# Patient Record
Sex: FEMALE | Race: WHITE | URBAN - METROPOLITAN AREA
[De-identification: names, ages, dates, MRNs, and addresses within clinical notes are randomized per-mention and may not be internally consistent; named-entity substitution may affect disease eponyms.]

---

## 2019-01-07 ENCOUNTER — DOCTOR'S OFFICE (OUTPATIENT)
Dept: URBAN - METROPOLITAN AREA CLINIC 136 | Facility: CLINIC | Age: 64
Setting detail: OPHTHALMOLOGY
End: 2019-01-07
Payer: COMMERCIAL

## 2019-01-07 DIAGNOSIS — H01.001: ICD-10-CM

## 2019-01-07 DIAGNOSIS — H25.13: ICD-10-CM

## 2019-01-07 DIAGNOSIS — H01.004: ICD-10-CM

## 2019-01-07 DIAGNOSIS — D21.0: ICD-10-CM

## 2019-01-07 DIAGNOSIS — H43.813: ICD-10-CM

## 2019-01-07 PROCEDURE — 92014 COMPRE OPH EXAM EST PT 1/>: CPT | Performed by: OPHTHALMOLOGY

## 2019-01-07 PROCEDURE — 92225 OPHTHALMOSCOPY EXTENDED INITIAL: CPT | Performed by: OPHTHALMOLOGY

## 2019-01-07 ASSESSMENT — REFRACTION_MANIFEST
OD_SPHERE: -4.50
OS_VA2: 20/20(J1+)
OD_VA1: 20/20
OD_VA3: 20/
OS_VA1: 20/20
OS_SPHERE: -4.50
OD_AXIS: 15
OD_ADD: +2.50
OD_ADD: +2.50
OD_SPHERE: -4.50
OS_ADD: +2.50
OS_CYLINDER: SPH
OD_CYLINDER: +0.50
OS_VA1: 20/20
OS_VA3: 20/
OD_AXIS: 15
OD_VA2: 20/
OD_CYLINDER: +1.00
OS_SPHERE: -4.00
OD_VA2: 20/20(J1+)
OS_VA2: 20/
OS_ADD: +2.50
OD_VA1: 20/20
OS_VA3: 20/
OU_VA: 20/
OU_VA: 20/
OS_CYLINDER: SPH
OD_VA3: 20/

## 2019-01-07 ASSESSMENT — REFRACTION_CURRENTRX
OD_VPRISM_DIRECTION: PROGS
OS_SPHERE: -4.50
OD_OVR_VA: 20/
OS_OVR_VA: 20/
OS_VPRISM_DIRECTION: PROGS
OS_OVR_VA: 20/
OD_ADD: +2.50
OD_SPHERE: -4.50
OD_CYLINDER: +0.50
OS_CYLINDER: SPH
OS_OVR_VA: 20/
OD_AXIS: 15
OD_OVR_VA: 20/
OS_ADD: +2.50
OD_OVR_VA: 20/

## 2019-01-07 ASSESSMENT — VISUAL ACUITY
OS_BCVA: 20/40-1
OD_BCVA: 20/30+1

## 2019-01-07 ASSESSMENT — REFRACTION_AUTOREFRACTION
OD_AXIS: 16
OS_CYLINDER: SPH
OS_SPHERE: -4.00
OD_CYLINDER: +1.00
OD_SPHERE: -4.50

## 2019-01-07 ASSESSMENT — LID EXAM ASSESSMENTS
OS_BLEPHARITIS: 1+
OD_BLEPHARITIS: 1+

## 2019-01-07 ASSESSMENT — SPHEQUIV_DERIVED
OD_SPHEQUIV: -4.25
OD_SPHEQUIV: -4
OD_SPHEQUIV: -4

## 2019-01-07 ASSESSMENT — CONFRONTATIONAL VISUAL FIELD TEST (CVF)
OD_FINDINGS: FULL
OS_FINDINGS: FULL

## 2019-01-29 ENCOUNTER — AMBUL SURGICAL CARE (OUTPATIENT)
Dept: URBAN - METROPOLITAN AREA SURGERY 32 | Facility: SURGERY | Age: 64
Setting detail: OPHTHALMOLOGY
End: 2019-01-29
Payer: COMMERCIAL

## 2019-01-29 DIAGNOSIS — D23.121: ICD-10-CM

## 2019-01-29 DIAGNOSIS — D23.112: ICD-10-CM

## 2019-01-29 DIAGNOSIS — D23.10: ICD-10-CM

## 2019-01-29 PROCEDURE — 67810 INCAL BX EYELID SKN LID MRGN: CPT | Performed by: OPHTHALMOLOGY

## 2019-01-29 PROCEDURE — 88305 TISSUE EXAM BY PATHOLOGIST: CPT | Performed by: PATHOLOGY

## 2019-01-29 PROCEDURE — G8907 PT DOC NO EVENTS ON DISCHARG: HCPCS | Performed by: OPHTHALMOLOGY

## 2019-01-29 PROCEDURE — G8918 PT W/O PREOP ORDER IV AB PRO: HCPCS | Performed by: OPHTHALMOLOGY

## 2019-01-29 PROCEDURE — 67840 REMOVE EYELID LESION: CPT | Performed by: OPHTHALMOLOGY

## 2019-01-30 ENCOUNTER — LAB REQUISITION (OUTPATIENT)
Dept: LAB | Facility: HOSPITAL | Age: 64
End: 2019-01-30
Payer: COMMERCIAL

## 2019-01-30 DIAGNOSIS — D23.10 OTHER BENIGN NEOPLASM OF SKIN OF UNSPECIFIED EYELID, INCLUDING CANTHUS: ICD-10-CM

## 2019-02-08 ENCOUNTER — RX ONLY (RX ONLY)
Age: 64
End: 2019-02-08

## 2019-02-08 ENCOUNTER — DOCTOR'S OFFICE (OUTPATIENT)
Dept: URBAN - METROPOLITAN AREA CLINIC 136 | Facility: CLINIC | Age: 64
Setting detail: OPHTHALMOLOGY
End: 2019-02-08
Payer: COMMERCIAL

## 2019-02-08 DIAGNOSIS — D21.0: ICD-10-CM

## 2019-02-08 PROCEDURE — 99024 POSTOP FOLLOW-UP VISIT: CPT | Performed by: OPHTHALMOLOGY

## 2019-02-08 ASSESSMENT — LID EXAM ASSESSMENTS
OD_BLEPHARITIS: 1+
OS_BLEPHARITIS: 1+

## 2019-02-14 ENCOUNTER — DOCTOR'S OFFICE (OUTPATIENT)
Dept: URBAN - METROPOLITAN AREA CLINIC 136 | Facility: CLINIC | Age: 64
Setting detail: OPHTHALMOLOGY
End: 2019-02-14

## 2019-02-14 DIAGNOSIS — H52.4: ICD-10-CM

## 2019-02-14 PROCEDURE — 92015 DETERMINE REFRACTIVE STATE: CPT | Performed by: OPTOMETRIST

## 2019-02-14 ASSESSMENT — REFRACTION_MANIFEST
OU_VA: 20/
OD_VA3: 20/
OS_VA1: 20/
OS_VA3: 20/
OD_VA3: 20/
OS_ADD: +2.50
OD_VA2: 20/
OS_VA3: 20/
OS_SPHERE: -3.00
OS_VA2: 20/
OD_VA1: 20/
OD_VA2: 20/
OS_VA2: 20/
OD_VA1: 20/
OS_VA1: 20/
OD_ADD: +2.50
OD_CYLINDER: SPH
OS_CYLINDER: SPH
OU_VA: 20/
OD_SPHERE: -3.00

## 2019-02-14 ASSESSMENT — REFRACTION_CURRENTRX
OS_OVR_VA: 20/
OD_OVR_VA: 20/
OD_AXIS: 15
OS_ADD: +2.50
OS_SPHERE: -4.50
OD_ADD: +2.50
OD_VPRISM_DIRECTION: PROGS
OD_OVR_VA: 20/
OD_OVR_VA: 20/
OD_CYLINDER: +0.50
OS_OVR_VA: 20/
OS_CYLINDER: SPH
OD_SPHERE: -4.50
OS_VPRISM_DIRECTION: PROGS
OS_OVR_VA: 20/

## 2019-02-14 ASSESSMENT — REFRACTION_AUTOREFRACTION
OS_CYLINDER: -0.25
OD_SPHERE: -2.50
OS_AXIS: 061
OD_CYLINDER: -0.50
OD_AXIS: 092
OS_SPHERE: -2.50

## 2019-02-14 ASSESSMENT — SPHEQUIV_DERIVED
OS_SPHEQUIV: -2.625
OD_SPHEQUIV: -2.75

## 2019-02-14 ASSESSMENT — VISUAL ACUITY
OD_BCVA: 20/40+2
OS_BCVA: 20/40

## 2019-02-25 PROBLEM — H43.813 POSTERIOR VITREOUS DETACHMENT; BOTH EYES: Status: ACTIVE | Noted: 2019-01-07

## 2019-02-25 PROBLEM — H52.13 MYOPIA OU; BOTH EYES: Status: ACTIVE | Noted: 2019-01-07

## 2019-02-25 PROBLEM — H25.13 CATARACT NUCLEAR SCLEROSIS; BOTH EYES: Status: ACTIVE | Noted: 2019-01-07

## 2019-02-25 PROBLEM — H52.4 PRESBYOPIA ; BOTH EYES: Status: ACTIVE | Noted: 2019-01-07

## 2019-02-25 PROBLEM — H43.311: Status: ACTIVE | Noted: 2019-01-07

## 2019-02-25 PROBLEM — H01.00 BLEPHARITIS; RIGHT UPPER LID, LEFT UPPER LID: Status: ACTIVE | Noted: 2019-02-08

## 2019-02-25 PROBLEM — D21.0 BENIGN LESION FACE: Status: ACTIVE | Noted: 2019-01-07

## 2019-02-25 ASSESSMENT — REFRACTION_MANIFEST
OD_VA3: 20/
OD_AXIS: 15
OS_SPHERE: -4.00
OD_VA2: 20/20(J1+)
OS_CYLINDER: SPH
OS_VA1: 20/20
OD_VA2: 20/
OS_VA3: 20/
OS_CYLINDER: SPH
OD_ADD: +2.50
OD_VA1: 20/20
OD_ADD: +2.50
OS_VA2: 20/20(J1+)
OS_ADD: +2.50
OU_VA: 20/
OS_VA1: 20/20
OS_SPHERE: -4.50
OS_ADD: +2.50
OD_SPHERE: -4.50
OS_VA3: 20/
OD_CYLINDER: +0.50
OU_VA: 20/
OD_AXIS: 15
OD_SPHERE: -4.50
OD_CYLINDER: +1.00
OD_VA1: 20/20
OD_VA3: 20/
OS_VA2: 20/

## 2019-02-25 ASSESSMENT — REFRACTION_AUTOREFRACTION
OS_SPHERE: -4.00
OD_SPHERE: -4.50
OD_AXIS: 16
OD_CYLINDER: +1.00
OS_CYLINDER: SPH

## 2019-02-25 ASSESSMENT — REFRACTION_CURRENTRX
OS_OVR_VA: 20/
OD_VPRISM_DIRECTION: PROGS
OS_ADD: +2.50
OS_CYLINDER: SPH
OD_OVR_VA: 20/
OD_OVR_VA: 20/
OD_CYLINDER: +0.50
OS_VPRISM_DIRECTION: PROGS
OS_SPHERE: -4.50
OD_AXIS: 15
OD_SPHERE: -4.50
OS_OVR_VA: 20/
OD_ADD: +2.50
OS_OVR_VA: 20/
OD_OVR_VA: 20/

## 2019-02-25 ASSESSMENT — VISUAL ACUITY
OS_BCVA: 20/50+2
OD_BCVA: 20/25-1

## 2019-02-25 ASSESSMENT — SPHEQUIV_DERIVED
OD_SPHEQUIV: -4.25
OD_SPHEQUIV: -4
OD_SPHEQUIV: -4

## 2019-09-12 ENCOUNTER — OPTICAL OFFICE (OUTPATIENT)
Dept: URBAN - METROPOLITAN AREA CLINIC 143 | Facility: CLINIC | Age: 64
Setting detail: OPHTHALMOLOGY
End: 2019-09-12

## 2019-09-12 DIAGNOSIS — H52.13: ICD-10-CM

## 2019-09-12 PROCEDURE — V2020 VISION SVCS FRAMES PURCHASES: HCPCS | Performed by: OPTOMETRIST

## 2019-09-12 PROCEDURE — V2781 PROGRESSIVE LENS PER LENS: HCPCS | Performed by: OPTOMETRIST

## 2019-09-12 PROCEDURE — V2782 LENS, 1.54-1.65 P/1.60-1.79G: HCPCS | Performed by: OPTOMETRIST

## 2019-09-12 PROCEDURE — V2750 ANTI-REFLECTIVE COATING: HCPCS | Performed by: OPTOMETRIST

## 2024-10-17 ENCOUNTER — EVALUATION (OUTPATIENT)
Dept: PHYSICAL THERAPY | Facility: CLINIC | Age: 69
End: 2024-10-17
Payer: COMMERCIAL

## 2024-10-17 DIAGNOSIS — M54.41 ACUTE RIGHT-SIDED LOW BACK PAIN WITH RIGHT-SIDED SCIATICA: Primary | ICD-10-CM

## 2024-10-17 PROCEDURE — 97112 NEUROMUSCULAR REEDUCATION: CPT | Performed by: PHYSICAL THERAPIST

## 2024-10-17 PROCEDURE — 97162 PT EVAL MOD COMPLEX 30 MIN: CPT | Performed by: PHYSICAL THERAPIST

## 2024-10-17 NOTE — LETTER
2024    THONY Mcqueen  [address] [address]    Patient: Keren Kim   YOB: 1955   Date of Visit: 10/17/2024     Encounter Diagnosis     ICD-10-CM    1. Acute right-sided low back pain with right-sided sciatica  M54.41           Dear Dr. Healy:    Thank you for your recent referral of Keren Kim. Please review the attached evaluation summary from Keren's recent visit.     Please verify that you agree with the plan of care by signing the attached order.     If you have any questions or concerns, please do not hesitate to call.     I sincerely appreciate the opportunity to share in the care of one of your patients and hope to have another opportunity to work with you in the near future.       Sincerely,    Pancho Carpenter, PT      Referring Provider:      I certify that I have read the below Plan of Care and certify the need for these services furnished under this plan of treatment while under my care.                    THONY Mcqueen  [address] [address]  Via Fax: [phone]          PT Evaluation     Today's date: 10/17/2024  Patient name: Keren Kim  : 1955  MRN: [id number]  Referring provider: Yojana Healy, *  Dx: No diagnosis found.               Assessment  Impairments: abnormal muscle firing, abnormal muscle tone, abnormal or restricted ROM, abnormal movement, activity intolerance, impaired physical strength, lacks appropriate home exercise program, pain with function, weight-bearing intolerance, participation limitations and activity limitations    Assessment details: 69 year old female patient reports to PT with R sided low back pain with R LE radiating symptoms. No red flags noted and LQS revealed slight decrease R ankle DF strength. Patient presents with suspected directional preference for lumbar extension based on subjective/objective findings as had  "centralization with repeated extension. Patient will benefit from skilled PT services to address current impairments and functional limitations to help patient return to her PLOF.       Understanding of Dx/Px/POC: good     Prognosis: good    Goals  STG  1. Patient will be independent with completion of HEP throughout therapy  2. Patient will have at worst 3/10 pain so patient can sit with less discomfort in 3 weeks.  LTG  1. Patient will stand for prolonged periods without increase in symptoms so patient can complete more household tasks with less difficulty in 4 weeks.  2. Patient will complete her job without increase in difficulty in 4 weeks.     Plan  Patient would benefit from: skilled physical therapy    Planned therapy interventions: abdominal trunk stabilization, activity modification, joint mobilization, IASTM, manual therapy, motor coordination training, nerve gliding, neuromuscular re-education, patient/caregiver education, strengthening, stretching, therapeutic activities, therapeutic exercise, home exercise program, functional ROM exercises and flexibility    Treatment plan discussed with: patient        Subjective Evaluation    History of Present Illness  Mechanism of injury: Patient reports with right sided back pain with R sided \"sciatica\" that started about 2 months ago. Patient picked up her cat's litter box and felt back pain immediately. Patient then had back pain progress to go down her R LE within the next week for about 3 weeks down to her foot and now it seems to go down to her knee. Patient notes a little numbness/tingling in her foot intermittently. Patient note she didn't have numbness/tingling in her foot when she had pain down her whole leg which she describes as burning. Patient notes she primarily gets the numbness/tingling in her R foot when she is sitting. Patient note the pain down to her R knee also happens when she is sitting and standing. Patient denies difficulty sleeping. "     Patient denies difficulty walking.   Patient Goals  Patient goals for therapy: decreased pain, increased motion, increased strength and return to sport/leisure activities    Pain  Current pain ratin  At best pain ratin  At worst pain ratin  Quality: burning and dull ache  Relieving factors: medications, change in position and heat  Aggravating factors: standing and sitting    Treatments  Previous treatment: medication  Current treatment: physical therapy        Objective     Concurrent Complaints  Negative for night pain, disturbed sleep, bladder dysfunction, bowel dysfunction and saddle (S4) numbness    Neurological Testing     Sensation     Lumbar   Left   Intact: light touch    Right   Intact: light touch    Active Range of Motion     Lumbar   Flexion:  Restriction level: minimal  Extension:  Restriction level: moderate  Mechanical Assessment    Cervical      Thoracic      Lumbar    Standing flexion: repeated movements   Pain location:peripheralized  Standing extension: repeated movements  Pain location: centralized  Pain intensity: better  Pain level: decreased  Lying extension: repeated movements  Pain location: centralized  Pain level: abolished    Strength/Myotome Testing     Left Hip   Planes of Motion   Flexion: 4  Extension: 3+    Right Hip   Planes of Motion   Flexion: 4  Extension: 3+    Left Knee   Flexion: 4  Extension: 4    Right Knee   Flexion: 4  Extension: 4    Left Ankle/Foot   Dorsiflexion: 4    Right Ankle/Foot   Dorsiflexion: 3+             Precautions: osteoporosis      Manuals 10/17            Lumbar mobs As needed                                                   Neuro Re-Ed             Prone press up/standing lumbar extension r                         bridges             Standing band anti-rotations             Walk outs                          Sitting posture r            Ther Ex             treadmill             Prone altern UE/LE                                                                                            Ther Activity                                       Gait Training                                       Modalities

## 2024-10-17 NOTE — PROGRESS NOTES
PT Evaluation     Today's date: 10/18/2024  Patient name: Keren Kim  : 1955  MRN: [id number]  Referring provider: Yojana Healy, *  Dx:   Encounter Diagnosis     ICD-10-CM    1. Acute right-sided low back pain with right-sided sciatica  M54.41           Start Time: 1705  Stop Time: 1745  Total time in clinic (min): 40 minutes    Assessment  Impairments: abnormal muscle firing, abnormal muscle tone, abnormal or restricted ROM, abnormal movement, activity intolerance, impaired physical strength, lacks appropriate home exercise program, pain with function, weight-bearing intolerance, participation limitations and activity limitations    Assessment details: 69 year old female patient reports to PT with R sided low back pain with R LE radiating symptoms. No red flags noted and LQS revealed slight decrease R ankle DF strength. Patient presents with suspected directional preference for lumbar extension based on subjective/objective findings as had centralization with repeated extension. Patient will benefit from skilled PT services to address current impairments and functional limitations to help patient return to her PLOF.       Understanding of Dx/Px/POC: good     Prognosis: good    Goals  STG  1. Patient will be independent with completion of HEP throughout therapy  2. Patient will have at worst 3/10 pain so patient can sit with less discomfort in 3 weeks.  LTG  1. Patient will stand for prolonged periods without increase in symptoms so patient can complete more household tasks with less difficulty in 4 weeks.  2. Patient will complete her job without increase in difficulty in 4 weeks.     Plan  Patient would benefit from: skilled physical therapy    Planned therapy interventions: abdominal trunk stabilization, activity modification, joint mobilization, IASTM, manual therapy, motor coordination training, nerve gliding, neuromuscular re-education, patient/caregiver education, strengthening,  "stretching, therapeutic activities, therapeutic exercise, home exercise program, functional ROM exercises and flexibility    Treatment plan discussed with: patient        Subjective Evaluation    History of Present Illness  Mechanism of injury: Patient reports with right sided back pain with R sided \"sciatica\" that started about 2 months ago. Patient picked up her cat's litter box and felt back pain immediately. Patient then had back pain progress to go down her R LE within the next week for about 3 weeks down to her foot and now it seems to go down to her knee. Patient notes a little numbness/tingling in her foot intermittently. Patient note she didn't have numbness/tingling in her foot when she had pain down her whole leg which she describes as burning. Patient notes she primarily gets the numbness/tingling in her R foot when she is sitting. Patient note the pain down to her R knee also happens when she is sitting and standing. Patient denies difficulty sleeping.     Patient denies difficulty walking.   Patient Goals  Patient goals for therapy: decreased pain, increased motion, increased strength and return to sport/leisure activities    Pain  Current pain ratin  At best pain ratin  At worst pain ratin  Quality: burning and dull ache  Relieving factors: medications, change in position and heat  Aggravating factors: standing and sitting    Treatments  Previous treatment: medication  Current treatment: physical therapy        Objective     Concurrent Complaints  Negative for night pain, disturbed sleep, bladder dysfunction, bowel dysfunction and saddle (S4) numbness    Neurological Testing     Sensation     Lumbar   Left   Intact: light touch    Right   Intact: light touch    Active Range of Motion     Lumbar   Flexion:  Restriction level: minimal  Extension:  Restriction level: moderate  Mechanical Assessment    Cervical      Thoracic      Lumbar    Standing flexion: repeated movements   Pain " location:peripheralized  Standing extension: repeated movements  Pain location: centralized  Pain intensity: better  Pain level: decreased  Lying extension: repeated movements  Pain location: centralized  Pain level: abolished    Strength/Myotome Testing     Left Hip   Planes of Motion   Flexion: 4  Extension: 3+    Right Hip   Planes of Motion   Flexion: 4  Extension: 3+    Left Knee   Flexion: 4  Extension: 4    Right Knee   Flexion: 4  Extension: 4    Left Ankle/Foot   Dorsiflexion: 4    Right Ankle/Foot   Dorsiflexion: 3+      Flowsheet Rows      Flowsheet Row Most Recent Value   PT/OT G-Codes    Current Score 52   Projected Score 62               Precautions: osteoporosis      Manuals 10/17            Lumbar mobs As needed                                                   Neuro Re-Ed             Prone press up/standing lumbar extension r                         bridges             Standing band anti-rotations             Walk outs                          Sitting posture r            Ther Ex             treadmill             Prone altern UE/LE                                                                                           Ther Activity                                       Gait Training                                       Modalities

## 2024-10-21 ENCOUNTER — OFFICE VISIT (OUTPATIENT)
Dept: PHYSICAL THERAPY | Facility: CLINIC | Age: 69
End: 2024-10-21
Payer: COMMERCIAL

## 2024-10-21 DIAGNOSIS — M54.41 ACUTE RIGHT-SIDED LOW BACK PAIN WITH RIGHT-SIDED SCIATICA: Primary | ICD-10-CM

## 2024-10-21 PROCEDURE — 97140 MANUAL THERAPY 1/> REGIONS: CPT | Performed by: PHYSICAL THERAPIST

## 2024-10-21 PROCEDURE — 97110 THERAPEUTIC EXERCISES: CPT | Performed by: PHYSICAL THERAPIST

## 2024-10-21 PROCEDURE — 97112 NEUROMUSCULAR REEDUCATION: CPT | Performed by: PHYSICAL THERAPIST

## 2024-10-21 NOTE — PROGRESS NOTES
"Daily Note     Today's date: 10/21/2024  Patient name: Keren Kim  : 1955  MRN: [id number]  Referring provider: Yojana Healy, *  Dx:   Encounter Diagnosis     ICD-10-CM    1. Acute right-sided low back pain with right-sided sciatica  M54.41                      Subjective: Patient reports had 8 hours of on pain yesterday.      Objective: See treatment diary below      Assessment: Tolerated treatment well. Patient would benefit from continued PT. Further centralization with right side glides. Added to HEP to complete prior to completing prone press ups/standing lumbar extension.      Plan: Progress treatment as tolerated.       Precautions: osteoporosis      Manuals 10/17 10/21           Lumbar mobs As needed MK                                                  Neuro Re-Ed             Prone press up/standing lumbar extension r 10x                         bridges  20x 3\" holds           Standing band anti-rotations  12x B grn           Walk outs                          Sitting posture r            Ther Ex             treadmill  5 min           Prone altern UE/LE                          Right side glides  10x                                                               Ther Activity                                       Gait Training                                       Modalities                                            "

## 2024-10-24 ENCOUNTER — OFFICE VISIT (OUTPATIENT)
Dept: PHYSICAL THERAPY | Facility: CLINIC | Age: 69
End: 2024-10-24
Payer: COMMERCIAL

## 2024-10-24 DIAGNOSIS — M54.41 ACUTE RIGHT-SIDED LOW BACK PAIN WITH RIGHT-SIDED SCIATICA: Primary | ICD-10-CM

## 2024-10-24 PROCEDURE — 97140 MANUAL THERAPY 1/> REGIONS: CPT | Performed by: PHYSICAL THERAPIST

## 2024-10-24 PROCEDURE — 97110 THERAPEUTIC EXERCISES: CPT | Performed by: PHYSICAL THERAPIST

## 2024-10-24 PROCEDURE — 97112 NEUROMUSCULAR REEDUCATION: CPT | Performed by: PHYSICAL THERAPIST

## 2024-10-24 NOTE — PROGRESS NOTES
"Daily Note     Today's date: 10/24/2024  Patient name: Keren Kim  : 1955  MRN: [id number]  Referring provider: Yojana Healy, *  Dx:   Encounter Diagnosis     ICD-10-CM    1. Acute right-sided low back pain with right-sided sciatica  M54.41                      Subjective: Patient reports had good day yesterday but bad night.      Objective: See treatment diary below      Assessment: Tolerated treatment well. Patient would benefit from continued PT. Further centralization with press ups with a right side glide. Added to HEP.      Plan: Progress treatment as tolerated.       Precautions: osteoporosis      Manuals 10/17 10/21 10/24          Lumbar mobs As needed MK Press ups w/ OP                                                  Neuro Re-Ed             Prone press up/standing lumbar extension r 10x  assessed                       bridges  20x 3\" holds 20x 3\" holds          Standing band anti-rotations  12x B grn 12x 3\" holds B grn           Walk outs                          Sitting posture r            Ther Ex             treadmill  5 min 5 min          Prone altern UE/LE                          Right side glides  10x 10x          Prone press ups with right side gilde   10x and further improved symptoms                                                 Ther Activity                                       Gait Training                                       Modalities                                            "

## 2024-10-28 ENCOUNTER — OFFICE VISIT (OUTPATIENT)
Dept: PHYSICAL THERAPY | Facility: CLINIC | Age: 69
End: 2024-10-28
Payer: COMMERCIAL

## 2024-10-28 DIAGNOSIS — M54.41 ACUTE RIGHT-SIDED LOW BACK PAIN WITH RIGHT-SIDED SCIATICA: Primary | ICD-10-CM

## 2024-10-28 PROCEDURE — 97112 NEUROMUSCULAR REEDUCATION: CPT | Performed by: PHYSICAL THERAPIST

## 2024-10-28 PROCEDURE — 97140 MANUAL THERAPY 1/> REGIONS: CPT | Performed by: PHYSICAL THERAPIST

## 2024-10-28 PROCEDURE — 97110 THERAPEUTIC EXERCISES: CPT | Performed by: PHYSICAL THERAPIST

## 2024-10-28 NOTE — PROGRESS NOTES
"Daily Note     Today's date: 10/28/2024  Patient name: Keren Kim  : 1955  MRN: [id number]  Referring provider: Yojana Healy, *  Dx:   Encounter Diagnosis     ICD-10-CM    1. Acute right-sided low back pain with right-sided sciatica  M54.41                      Subjective: Patient reports last night and this morning symptoms increased. Also Saturday increased as well. Had good day Friday. Notes only doing exercises 2x a day.       Objective: See treatment diary below      Assessment: Tolerated treatment well. Patient would benefit from continued PT. Educated to increase exercises to 4x a day.      Plan: Progress treatment as tolerated.       Precautions: osteoporosis      Manuals 10/17 10/21 10/24 10/28         Lumbar mobs As needed MK Press ups w/ OP           Psosa assessed    Lower   L LTR FMT MK                                   Neuro Re-Ed             Prone press up/standing lumbar extension r 10x  assessed                       bridges  20x 3\" holds 20x 3\" holds          Standing band anti-rotations  12x B grn 12x 3\" holds B grn  15x 3\" holds B grn          Walk outs                          Sitting posture r            Ther Ex             treadmill  5 min 5 min          Prone altern UE/LE                          Right side glides  10x 10x          Prone press ups with right side gilde   10x and further improved symptoms 5x w/ belt, exercises, 10x w/ belt and pain abolished                                                Ther Activity                                       Gait Training                                       Modalities                                            "

## 2024-10-31 ENCOUNTER — OFFICE VISIT (OUTPATIENT)
Dept: PHYSICAL THERAPY | Facility: CLINIC | Age: 69
End: 2024-10-31
Payer: COMMERCIAL

## 2024-10-31 DIAGNOSIS — M54.41 ACUTE RIGHT-SIDED LOW BACK PAIN WITH RIGHT-SIDED SCIATICA: Primary | ICD-10-CM

## 2024-10-31 PROCEDURE — 97140 MANUAL THERAPY 1/> REGIONS: CPT | Performed by: PHYSICAL THERAPIST

## 2024-10-31 PROCEDURE — 97110 THERAPEUTIC EXERCISES: CPT | Performed by: PHYSICAL THERAPIST

## 2024-10-31 PROCEDURE — 97112 NEUROMUSCULAR REEDUCATION: CPT | Performed by: PHYSICAL THERAPIST

## 2024-10-31 NOTE — PROGRESS NOTES
"Daily Note     Today's date: 10/31/2024  Patient name: Keren Kim  : 1955  MRN: [id number]  Referring provider: Yojana Healy, *  Dx:   Encounter Diagnosis     ICD-10-CM    1. Acute right-sided low back pain with right-sided sciatica  M54.41                      Subjective: Patient reports had bad last 2 nights but is feeling better overall.       Objective: See treatment diary below      Assessment: Tolerated treatment well. Patient would benefit from continued PT. Difficulty with anti rotation walk out exercise as first visit it was introduced. Continues to centralize with extension. Educated importance of completing several times a day.      Plan: Progress treatment as tolerated.       Precautions: osteoporosis      Manuals 10/17 10/21 10/24 10/28 10/31        Lumbar mobs As needed MK Press ups w/ OP           Psosa assessed    Lower   L LTR FMT MK MK B                                   Neuro Re-Ed             Prone press up/standing lumbar extension r 10x  assessed  10x, 5x w/ PT OP                     bridges  20x 3\" holds 20x 3\" holds  20x 3\" holds         Standing band anti-rotations  12x B grn 12x 3\" holds B grn  15x 3\" holds B grn  15x 3\" holds        Walk outs     5x 8 lbs R side, 4 lbs L side                      Sitting posture r            Ther Ex             treadmill  5 min 5 min  5 min        Prone altern UE/LE     10x B                     Right side glides  10x 10x  10x        Prone press ups with right side gilde   10x and further improved symptoms 5x w/ belt, exercises, 10x w/ belt and pain abolished                                                Ther Activity                                       Gait Training                                       Modalities                                            "

## 2024-11-07 ENCOUNTER — OFFICE VISIT (OUTPATIENT)
Dept: PHYSICAL THERAPY | Facility: CLINIC | Age: 69
End: 2024-11-07
Payer: COMMERCIAL

## 2024-11-07 DIAGNOSIS — M54.41 ACUTE RIGHT-SIDED LOW BACK PAIN WITH RIGHT-SIDED SCIATICA: Primary | ICD-10-CM

## 2024-11-07 PROCEDURE — 97110 THERAPEUTIC EXERCISES: CPT | Performed by: PHYSICAL THERAPIST

## 2024-11-07 PROCEDURE — 97112 NEUROMUSCULAR REEDUCATION: CPT | Performed by: PHYSICAL THERAPIST

## 2024-11-07 PROCEDURE — 97140 MANUAL THERAPY 1/> REGIONS: CPT | Performed by: PHYSICAL THERAPIST

## 2024-11-07 NOTE — PROGRESS NOTES
"Daily Note     Today's date: 2024  Patient name: Keren Kim  : 1955  MRN: [id number]  Referring provider: Yojana Healy, *  Dx:   Encounter Diagnosis     ICD-10-CM    1. Acute right-sided low back pain with right-sided sciatica  M54.41                        Subjective: Patient reports feels better than last week. Still worst in the morning and after 6 pm. Couches make her symptoms worse but sitting in more supportive chair is better.      Objective: See treatment diary below    Possible longer R LE  -anterior rotated R inomiante     Assessment: Tolerated treatment well. Patient would benefit from continued PT. Most relief with extension based exercises. Significant relief post standing lumbar extension repeated movement. Still gets aggravated symptoms with palloff press. Post treatment had no symptoms.    Plan: Progress treatment as tolerated.       Precautions: osteoporosis      Manuals 10/17 10/21 10/24 10/28 10/31 11       Lumbar mobs As needed MK Press ups w/ OP    MK       Psosa assessed    Lower   L LTR FMT MK MK B  MK B                                 Neuro Re-Ed             Prone press up/standing lumbar extension r 10x  assessed  10x, 5x w/ PT OP Prone press ups w/ exhale, standing repeated extension post treatment                     bridges  20x 3\" holds 20x 3\" holds  20x 3\" holds  10x 10\" holds       Standing band anti-rotations  12x B grn 12x 3\" holds B grn  15x 3\" holds B grn  15x 3\" holds 15x 3\" holds        Walk outs     5x 8 lbs R side, 4 lbs L side         quadruped      15x B altern LEs       Sitting posture r            Ther Ex             treadmill  5 min 5 min  5 min 5 min       Prone altern UE/LE     10x B 10x B                    Right side glides  10x 10x  10x        Prone press ups with right side gilde   10x and further improved symptoms 5x w/ belt, exercises, 10x w/ belt and pain abolished                                                Ther Activity           "                             Gait Training                                       Modalities                                             Patient tolerated procedure well.

## 2024-11-11 ENCOUNTER — OFFICE VISIT (OUTPATIENT)
Dept: PHYSICAL THERAPY | Facility: CLINIC | Age: 69
End: 2024-11-11
Payer: COMMERCIAL

## 2024-11-11 DIAGNOSIS — M54.41 ACUTE RIGHT-SIDED LOW BACK PAIN WITH RIGHT-SIDED SCIATICA: Primary | ICD-10-CM

## 2024-11-11 PROCEDURE — 97112 NEUROMUSCULAR REEDUCATION: CPT | Performed by: PHYSICAL THERAPIST

## 2024-11-11 PROCEDURE — 97110 THERAPEUTIC EXERCISES: CPT | Performed by: PHYSICAL THERAPIST

## 2024-11-11 PROCEDURE — 97140 MANUAL THERAPY 1/> REGIONS: CPT | Performed by: PHYSICAL THERAPIST

## 2024-11-11 NOTE — PROGRESS NOTES
"Daily Note     Today's date: 2024  Patient name: Keren Kim  : 1955  MRN: [id number]  Referring provider: Yojana Healy, *  Dx:   Encounter Diagnosis     ICD-10-CM    1. Acute right-sided low back pain with right-sided sciatica  M54.41                        Subjective: Patient reports cleaned over weekend and didn't have much flare up of symptoms. Notes woke up with pain in posterior proximal thigh.       Objective: See treatment diary below    Possible longer R LE  -anterior rotated R inomiante     Assessment: Tolerated treatment well. Patient would benefit from continued PT. Still most relief with extension based exercises. Still gets aggravated symptoms with palloff press but improved. Post treatment had no symptoms.    Plan: Progress treatment as tolerated.       Precautions: osteoporosis      Manuals 10/17 10/21 10/24 10/28 10/31 11/7 11/11      Lumbar mobs As needed MK Press ups w/ OP    MK MK      Psosa assessed    Lower   L LTR FMT MK MK B  MK B                                 Neuro Re-Ed             Prone press up/standing lumbar extension r 10x  assessed  10x, 5x w/ PT OP Prone press ups w/ exhale, standing repeated extension post treatment  Standing extension 5x complete centralization      Heel taps       10x B      bridges  20x 3\" holds 20x 3\" holds  20x 3\" holds  10x 10\" holds 10x 10\" holds       Standing band anti-rotations  12x B grn 12x 3\" holds B grn  15x 3\" holds B grn  15x 3\" holds 15x 3\" holds  15x 3\" holds       Walk outs     5x 8 lbs R side, 4 lbs L side         quadruped      15x B altern LEs -      Sitting posture r            Ther Ex             treadmill  5 min 5 min  5 min 5 min 5 min      Prone altern UE/LE     10x B 10x B 12x B                    Right side glides  10x 10x  10x        Prone press ups with right side gilde   10x and further improved symptoms 5x w/ belt, exercises, 10x w/ belt and pain abolished                                              "   Ther Activity                                       Gait Training                                       Modalities                                             [Disease: _____________________] : Disease: [unfilled] [AJCC Stage: ____] : AJCC Stage: [unfilled] [de-identified] : Ms. Pham is a 60 year old  postmenopausal who presents for discussion regarding treatment for fallopian tube cancer, and she has BRCA 1 pathogenic mutation.\par \par Ms. 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 Past GYN history significant for one episode of postmenopausal bleeding at the age of 47 which was ultimately determined to be a menstrual cycle. No history of uterine fibroids or ovarian cysts. Was on oral contraception from her early 40s until just a few weeks ago. \par \par Ms. Pham has a pertinent family history significant for breast (paternal cousin, paternal aunt) and ovarian (paternal cousin) cancer. \par Due to her high risk, she opted to go for risk reducing BSO and double mastectomy on 1/9/18.\par The frozen section showed malignancy and she underwent full surgical staging and mastectomy was postponed.\par Pathology: stage IC fallopian tube cancer.\par Patient has fully recovered from surgery. [de-identified] : Patient c/o thinning of hair. She was checked for any thyroid issues and she told that it was normal.\par Saw derm and was prescribed Rogaine.\par She is very stressed at work, working 13 hrs/day/3 days a week.\par No bladder or bowel issues.

## 2024-11-14 ENCOUNTER — OFFICE VISIT (OUTPATIENT)
Dept: PHYSICAL THERAPY | Facility: CLINIC | Age: 69
End: 2024-11-14
Payer: COMMERCIAL

## 2024-11-14 DIAGNOSIS — M54.41 ACUTE RIGHT-SIDED LOW BACK PAIN WITH RIGHT-SIDED SCIATICA: Primary | ICD-10-CM

## 2024-11-14 PROCEDURE — 97112 NEUROMUSCULAR REEDUCATION: CPT | Performed by: PHYSICAL THERAPIST

## 2024-11-14 PROCEDURE — 97140 MANUAL THERAPY 1/> REGIONS: CPT | Performed by: PHYSICAL THERAPIST

## 2024-11-14 PROCEDURE — 97110 THERAPEUTIC EXERCISES: CPT | Performed by: PHYSICAL THERAPIST

## 2024-11-14 NOTE — PROGRESS NOTES
"Daily Note     Today's date: 2024  Patient name: Keren Kim  : 1955  MRN: [id number]  Referring provider: Yojana Healy, *  Dx:   Encounter Diagnosis     ICD-10-CM    1. Acute right-sided low back pain with right-sided sciatica  M54.41                        Subjective: Patient reports overall better. Had not great day Tuesday. But feels overall better.      Objective: See treatment diary below    Possible longer R LE  -anterior rotated R inomiante     Assessment: Tolerated treatment well. Patient would benefit from continued PT. Still most relief with extension based exercises. Still gets aggravated symptoms with palloff press but improved. Post treatment had no symptoms.    Plan: Progress treatment as tolerated.  Assess side glides/prone press ups with sideglide component or flexion w/ rotation mob       Precautions: osteoporosis      Manuals 10/17 10/21 10/24 10/28 10/31 11/7 11/11 11/14     Lumbar mobs As needed MK Press ups w/ OP    MK MK MK L UPA      Psosa assessed    Lower   L LTR FMT MK MK B  MK B  MK R psoas/AP LTR and diagonal LTR                               Neuro Re-Ed             Prone press up/standing lumbar extension r 10x  assessed  10x, 5x w/ PT OP Prone press ups w/ exhale, standing repeated extension post treatment  Standing extension 5x complete centralization Prone press ups 10x, standing 15x post     Heel taps       10x B 15x B     bridges  20x 3\" holds 20x 3\" holds  20x 3\" holds  10x 10\" holds 10x 10\" holds  10x 10\" holds      Standing band anti-rotations  12x B grn 12x 3\" holds B grn  15x 3\" holds B grn  15x 3\" holds 15x 3\" holds  15x 3\" holds  15x 5\" holds     Walk outs     5x 8 lbs R side, 4 lbs L side         quadruped      15x B altern LEs -      Sitting posture r            Ther Ex             treadmill  5 min 5 min  5 min 5 min 5 min 5 min     Prone altern UE/LE     10x B 10x B 12x B  12x B                   Right side glides  10x 10x  10x        Prone " press ups with right side gilde   10x and further improved symptoms 5x w/ belt, exercises, 10x w/ belt and pain abolished                                                Ther Activity                                       Gait Training                                       Modalities

## 2024-11-18 ENCOUNTER — OFFICE VISIT (OUTPATIENT)
Dept: PHYSICAL THERAPY | Facility: CLINIC | Age: 69
End: 2024-11-18
Payer: COMMERCIAL

## 2024-11-18 DIAGNOSIS — M54.41 ACUTE RIGHT-SIDED LOW BACK PAIN WITH RIGHT-SIDED SCIATICA: Primary | ICD-10-CM

## 2024-11-18 PROCEDURE — 97140 MANUAL THERAPY 1/> REGIONS: CPT | Performed by: PHYSICAL THERAPIST

## 2024-11-18 PROCEDURE — 97110 THERAPEUTIC EXERCISES: CPT | Performed by: PHYSICAL THERAPIST

## 2024-11-18 PROCEDURE — 97112 NEUROMUSCULAR REEDUCATION: CPT | Performed by: PHYSICAL THERAPIST

## 2024-11-18 NOTE — PROGRESS NOTES
"Daily Note     Today's date: 2024  Patient name: Keren Kim  : 1955  MRN: [id number]  Referring provider: Yojana Healy, *  Dx:   Encounter Diagnosis     ICD-10-CM    1. Acute right-sided low back pain with right-sided sciatica  M54.41                        Subjective: Patient reports overall better. Not getting pain in the morning only at night after 6 pm.      Objective: See treatment diary below    Possible longer R LE  -anterior rotated R inomiante     Decreased R hip ER at 90/90 PROM    Assessment: Tolerated treatment well. Patient would benefit from continued PT. Still gets aggravated symptoms with palloff press. Post treatment had no symptoms.    Plan: Progress treatment as tolerated.  Assess side glides/prone press ups with sideglide component or flexion w/ rotation mob       Precautions: osteoporosis      Manuals 10/17 10/21 10/24 10/28 10/31 11/7 11/11 11/14 11/18    Lumbar mobs As needed MK Press ups w/ OP    MK MK MK L UPA  M L UPA    Psosa assessed    Lower   L LTR FMT MK MK B  MK B  MK R psoas/AP LTR and diagonal LTR MK     S/l glute STM         R MK    R ant rotated inominate MET correction s/l         MK    Neuro Re-Ed             Prone press up/standing lumbar extension r 10x  assessed  10x, 5x w/ PT OP Prone press ups w/ exhale, standing repeated extension post treatment  Standing extension 5x complete centralization Prone press ups 10x, standing 15x post Prone press ups 10x     Heel taps       10x B 15x B 15x B    bridges  20x 3\" holds 20x 3\" holds  20x 3\" holds  10x 10\" holds 10x 10\" holds  10x 10\" holds  10x 10\" holds     Standing band anti-rotations  12x B grn 12x 3\" holds B grn  15x 3\" holds B grn  15x 3\" holds 15x 3\" holds  15x 3\" holds  15x 5\" holds 15x 5\" holds     Walk outs     5x 8 lbs R side, 4 lbs L side         quadruped      15x B altern LEs -      Sitting posture r            Ther Ex             treadmill  5 min 5 min  5 min 5 min 5 min 5 min 5 min  "   Prone altern UE/LE     10x B 10x B 12x B  12x B  15x B                 Right side glides  10x 10x  10x        Prone press ups with right side gilde   10x and further improved symptoms 5x w/ belt, exercises, 10x w/ belt and pain abolished                                                Ther Activity                                       Gait Training                                       Modalities

## 2024-11-21 ENCOUNTER — OFFICE VISIT (OUTPATIENT)
Dept: PHYSICAL THERAPY | Facility: CLINIC | Age: 69
End: 2024-11-21
Payer: COMMERCIAL

## 2024-11-21 DIAGNOSIS — M54.41 ACUTE RIGHT-SIDED LOW BACK PAIN WITH RIGHT-SIDED SCIATICA: Primary | ICD-10-CM

## 2024-11-21 PROCEDURE — 97140 MANUAL THERAPY 1/> REGIONS: CPT | Performed by: PHYSICAL THERAPIST

## 2024-11-21 PROCEDURE — 97110 THERAPEUTIC EXERCISES: CPT | Performed by: PHYSICAL THERAPIST

## 2024-11-21 PROCEDURE — 97112 NEUROMUSCULAR REEDUCATION: CPT | Performed by: PHYSICAL THERAPIST

## 2024-11-21 NOTE — PROGRESS NOTES
"Daily Note     Today's date: 2024  Patient name: Keren Kim  : 1955  MRN: [id number]  Referring provider: Yojana Healy, *  Dx:   Encounter Diagnosis     ICD-10-CM    1. Acute right-sided low back pain with right-sided sciatica  M54.41                        Subjective: Patient reports even better than last visit. Notes further centralization of symptoms.       Objective: See treatment diary below    Possible longer R LE  -anterior rotated R inomiante     Decreased R hip ER at 90/90 PROM    Assessment: Tolerated treatment well. Patient would benefit from continued PT. Still gets aggravated symptoms with palloff press. Post treatment had no symptoms.    Plan: Progress treatment as tolerated.         Precautions: osteoporosis      Manuals 10/28 10/31 11/7 11/11 11/14 11/18 11/21   Lumbar mobs   MK MK MK L UPA  M L UPA MK   Psosa assessed Lower   L LTR FMT MK MK B  MK B  MK R psoas/AP LTR and diagonal LTR MK  MK   S/l glute STM      R MK MK   R ant rotated inominate MET correction s/l      MK    Neuro Re-Ed          Prone press up/standing lumbar extension  10x, 5x w/ PT OP Prone press ups w/ exhale, standing repeated extension post treatment  Standing extension 5x complete centralization Prone press ups 10x, standing 15x post Prone press ups 10x  Prone press ups with exhale 10x   Heel taps    10x B 15x B 15x B 20x B   bridges  20x 3\" holds  10x 10\" holds 10x 10\" holds  10x 10\" holds  10x 10\" holds  10x 10\" holds    Standing band anti-rotations 15x 3\" holds B grn  15x 3\" holds 15x 3\" holds  15x 3\" holds  15x 5\" holds 15x 5\" holds  15x 5\" holds B grn   Walk outs  5x 8 lbs R side, 4 lbs L side         quadruped   15x B altern LEs -      Sitting posture          Ther Ex          treadmill  5 min 5 min 5 min 5 min 5 min 5 min   Prone altern UE/LE  10x B 10x B 12x B  12x B  15x B 15x B             Right side glides  10x        Prone press ups with right side gilde 5x w/ belt, exercises, 10x w/ " belt and pain abolished                                       Ther Activity                              Gait Training                              Modalities

## 2024-11-25 ENCOUNTER — OFFICE VISIT (OUTPATIENT)
Dept: PHYSICAL THERAPY | Facility: CLINIC | Age: 69
End: 2024-11-25
Payer: COMMERCIAL

## 2024-11-25 DIAGNOSIS — M54.41 ACUTE RIGHT-SIDED LOW BACK PAIN WITH RIGHT-SIDED SCIATICA: Primary | ICD-10-CM

## 2024-11-25 PROCEDURE — 97112 NEUROMUSCULAR REEDUCATION: CPT | Performed by: PHYSICAL THERAPIST

## 2024-11-25 PROCEDURE — 97140 MANUAL THERAPY 1/> REGIONS: CPT | Performed by: PHYSICAL THERAPIST

## 2024-11-25 PROCEDURE — 97110 THERAPEUTIC EXERCISES: CPT | Performed by: PHYSICAL THERAPIST

## 2024-11-25 NOTE — PROGRESS NOTES
"Daily Note     Today's date: 2024  Patient name: Keren Kim  : 1955  MRN: [id number]  Referring provider: Yojana Healy, *  Dx:   Encounter Diagnosis     ICD-10-CM    1. Acute right-sided low back pain with right-sided sciatica  M54.41                        Subjective: Patient reports even better than last visit. Notes further centralization of symptoms.       Objective: See treatment diary below    Possible longer R LE  -anterior rotated R inomiante     Decreased R hip ER at 90/90 PROM    Assessment: Tolerated treatment well. Patient would benefit from continued PT. Still gets aggravated symptoms with palloff press. Post treatment had no symptoms.    Plan: Progress treatment as tolerated.         Precautions: osteoporosis      Manuals 10/28 10/31 11/7 11/11 11/14 11/18 11/21 11/25   Lumbar mobs   MK MK MK L UPA  M L UPA MK MK   Psosa assessed Lower   L LTR FMT MK MK B  MK B  MK R psoas/AP LTR and diagonal LTR MK  MK MK   S/l glute STM      R MK MK MK   R ant rotated inominate MET correction s/l      MK     Neuro Re-Ed           Prone press up/standing lumbar extension  10x, 5x w/ PT OP Prone press ups w/ exhale, standing repeated extension post treatment  Standing extension 5x complete centralization Prone press ups 10x, standing 15x post Prone press ups 10x  Prone press ups with exhale 10x 10x    Heel taps    10x B 15x B 15x B 20x B 20x B    bridges  20x 3\" holds  10x 10\" holds 10x 10\" holds  10x 10\" holds  10x 10\" holds  10x 10\" holds  10x 10\" holds    Standing band anti-rotations 15x 3\" holds B grn  15x 3\" holds 15x 3\" holds  15x 3\" holds  15x 5\" holds 15x 5\" holds  15x 5\" holds B grn    Walk outs  5x 8 lbs R side, 4 lbs L side          quadruped   15x B altern LEs -       Sitting posture           Ther Ex           treadmill  5 min 5 min 5 min 5 min 5 min 5 min 5 min   Prone altern UE/LE  10x B 10x B 12x B  12x B  15x B 15x B 15x B               Right side glides  10x         Prone " press ups with right side gilde 5x w/ belt, exercises, 10x w/ belt and pain abolished                                           Ther Activity                                 Gait Training                                 Modalities

## 2024-12-02 ENCOUNTER — APPOINTMENT (OUTPATIENT)
Dept: PHYSICAL THERAPY | Facility: CLINIC | Age: 69
End: 2024-12-02
Payer: COMMERCIAL

## 2024-12-05 ENCOUNTER — OFFICE VISIT (OUTPATIENT)
Dept: PHYSICAL THERAPY | Facility: CLINIC | Age: 69
End: 2024-12-05
Payer: COMMERCIAL

## 2024-12-05 DIAGNOSIS — M54.41 ACUTE RIGHT-SIDED LOW BACK PAIN WITH RIGHT-SIDED SCIATICA: Primary | ICD-10-CM

## 2024-12-05 PROCEDURE — 97140 MANUAL THERAPY 1/> REGIONS: CPT | Performed by: PHYSICAL THERAPIST

## 2024-12-05 PROCEDURE — 97110 THERAPEUTIC EXERCISES: CPT | Performed by: PHYSICAL THERAPIST

## 2024-12-05 PROCEDURE — 97112 NEUROMUSCULAR REEDUCATION: CPT | Performed by: PHYSICAL THERAPIST

## 2024-12-05 NOTE — PROGRESS NOTES
"Daily Note     Today's date: 2024  Patient name: Keren Kim  : 1955  MRN: [id number]  Referring provider: Yojana Healy, *  Dx:   Encounter Diagnosis     ICD-10-CM    1. Acute right-sided low back pain with right-sided sciatica  M54.41                        Subjective: Patient reports no pain for last 5 days.        Objective: See treatment diary below    Possible longer R LE  -anterior rotated R inomiante     Decreased R hip ER at 90/90 PROM    Assessment: Tolerated treatment well. Patient  is being discharged due to meeting all functional goals and showing ability to self manage her symptoms.        Plan: Progress treatment as tolerated.         Precautions: osteoporosis      Manuals    Lumbar mobs MK MK MK L UPA  M L UPA MK MK MK   Psosa assessed MK B  MK R psoas/AP LTR and diagonal LTR MK  MK MK    S/l glute STM    R MK MK MK    R ant rotated inominate MET correction s/l    MK      Neuro Re-Ed          Prone press up/standing lumbar extension Prone press ups w/ exhale, standing repeated extension post treatment  Standing extension 5x complete centralization Prone press ups 10x, standing 15x post Prone press ups 10x  Prone press ups with exhale 10x 10x     Heel taps  10x B 15x B 15x B 20x B 20x B  2x12    bridges 10x 10\" holds 10x 10\" holds  10x 10\" holds  10x 10\" holds  10x 10\" holds  10x 10\" holds  10x 10\" holds    Standing band anti-rotations 15x 3\" holds  15x 3\" holds  15x 5\" holds 15x 5\" holds  15x 5\" holds B grn  15x 5\" holds B grn    Walk outs          quadruped 15x B altern LEs -        Sitting posture          Ther Ex          treadmill 5 min 5 min 5 min 5 min 5 min 5 min 5 min    Prone altern UE/LE 10x B 12x B  12x B  15x B 15x B 15x B               Right side glides          Prone press ups with right side gilde                                        Ther Activity                              Gait Training                            "   Modalities

## 2025-05-29 ENCOUNTER — OFFICE VISIT (OUTPATIENT)
Dept: PHYSICAL THERAPY | Facility: CLINIC | Age: 70
End: 2025-05-29
Payer: COMMERCIAL

## 2025-05-29 DIAGNOSIS — M54.41 ACUTE RIGHT-SIDED LOW BACK PAIN WITH RIGHT-SIDED SCIATICA: Primary | ICD-10-CM

## 2025-05-29 PROCEDURE — 97112 NEUROMUSCULAR REEDUCATION: CPT | Performed by: PHYSICAL THERAPIST

## 2025-05-29 PROCEDURE — 97162 PT EVAL MOD COMPLEX 30 MIN: CPT | Performed by: PHYSICAL THERAPIST

## 2025-05-29 NOTE — LETTER
May 29, 2025    Yina Pickard MD  [address] [address]    Patient: Keren Kim   YOB: 1955   Date of Visit: 2025     Encounter Diagnosis     ICD-10-CM    1. Acute right-sided low back pain with right-sided sciatica  M54.41           Dear Dr. Yina Pickard MD:    A mutual patient of ours, Keren Kim, came for an evaluation today for her right sided low back and radiating right leg pain to her knee and groin. She was treated previously for similar symptoms with improvement of symptoms. Please review the attached evaluation summary from Keren's recent visit.     Please verify that you agree with the plan of care by signing the attached order.     If you have any questions or concerns, please do not hesitate to call.     I sincerely appreciate the opportunity to share in the care of one of your patients and hope to have another opportunity to work with you in the near future.       Sincerely,    Pancho Carpenter, PT      Referring Provider:      I certify that I have read the below Plan of Care and certify the need for these services furnished under this plan of treatment while under my care.                    Yina Pickard MD  [address] [address]  Via Fax: [phone]          PT Evaluation     Today's date: 2025  Patient name: Keren Kim  : 1955  MRN: [id number]  Referring provider: Pancho Carpenter, PT  Dx:   Encounter Diagnosis     ICD-10-CM    1. Acute right-sided low back pain with right-sided sciatica  M54.41           Start Time: 940  Stop Time: 1015  Total time in clinic (min): 35 minutes    Assessment  Impairments: abnormal muscle firing, abnormal muscle tone, abnormal or restricted ROM, abnormal movement, activity intolerance, impaired physical strength, lacks appropriate home exercise program, pain with function, weight-bearing intolerance, participation limitations, activity limitations and  endurance  Symptom irritability: moderate    Assessment details: 70 year old female patient reports to PT with R sided low back pain and radiating pain to posterior R knee and R groin. No red flags noted, no numbness/tingling, LQS unremarkable. Patient presents with R anteriorly rotated inominate, and decreased motor coordination of core musculature. FADIR negative and repeated movement assessment inconclusive at time of evaluation. Patient will benefit from skilled PT services to address current impairments and functional limitations to help patient return to her PLOF.       Understanding of Dx/Px/POC: good     Prognosis: good    Goals  STG  1. Patient will be independent with completion of HEP throughout therapy  2. Patient will have at worst 4/10 pain so patient can wake up with less discomfort in 3 weeks.  3. Patient will sit in her car without increase in groin pain in 4 weeks.  4. Patient will have no aggravation of symptoms with daily activities in 6 weeks.   5. Patient will increase B glute strength by at least 1/2 grade in 6 weeks.  6. Patient will have at least minimal restriction with lumbar extension in 6 weeks.    Plan  Patient would benefit from: skilled physical therapy          Subjective Evaluation    History of Present Illness  Mechanism of injury: Patient reports with R sided low back pain with radiating R LE pain that started again in early March. Patient notes most of her pain is in her R groin and behind her R knee. Patient denies numbness/tingling. Patient notes she wakes up with the pain and it is with her all day. Patient notes her pain is better at work when she is moving. Patient notes when she is driving she feels it in the groin area but not all the time. Patient denies difficulty sleeping. Patient notes she was very stressed at that time when her pain started.   Patient Goals  Patient goals for therapy: independence with ADLs/IADLs, return to sport/leisure activities, decreased pain and  increased motion    Pain  Current pain rating: 3  At best pain ratin  At worst pain ratin  Quality: dull ache and radiating  Relieving factors: change in position and medications          Objective     Concurrent Complaints  Negative for night pain, disturbed sleep, bladder dysfunction, bowel dysfunction and saddle (S4) numbness    Neurological Testing     Sensation     Lumbar   Left   Intact: light touch    Right   Intact: light touch    Active Range of Motion     Lumbar   Flexion:  WFL  Extension:  Restriction level: moderate    Strength/Myotome Testing     Left Hip   Planes of Motion   Flexion: 4  Extension: 3+  Abduction: 3+    Right Hip   Planes of Motion   Flexion: 4  Extension: 3+  Abduction: 3+    Left Knee   Flexion: 4  Extension: 4    Right Knee   Flexion: 4  Extension: 4    Left Ankle/Foot   Dorsiflexion: 4  Great toe extension: 4    Right Ankle/Foot   Dorsiflexion: 4-  Great toe extension: 4    Tests     Lumbar     Left   Negative crossed SLR, passive SLR and slump test.     Right   Negative crossed SLR, passive SLR and slump test.     General Comments:      Lumbar Comments  R hip ER 90/90 moderate limitation  R anteriorly rotated inominate      Flowsheet Rows      Flowsheet Row Most Recent Value   PT/OT G-Codes    Current Score 69   Projected Score 74               Precautions: osteoporosis      Manuals             Lumbar mobs As needed            Pelvic MET As needed                         Repeated movement reassessment As needed            Neuro Re-Ed             Diaphragmatic breathing r                         Bridges with PF+TA             Heel taps w/ PF+TA                                                    Ther Ex             Self pelvic MET to correct for R inominate r            Faby pose  r                         treadmill                                                                 Ther Activity                                       Gait Training                                        Modalities

## 2025-05-29 NOTE — PROGRESS NOTES
PT Evaluation     Today's date: 2025  Patient name: Keren Kim  : 1955  MRN: [id number]  Referring provider: Pancho Carpenter, PT  Dx:   Encounter Diagnosis     ICD-10-CM    1. Acute right-sided low back pain with right-sided sciatica  M54.41           Start Time: 0940  Stop Time: 1015  Total time in clinic (min): 35 minutes    Assessment  Impairments: abnormal muscle firing, abnormal muscle tone, abnormal or restricted ROM, abnormal movement, activity intolerance, impaired physical strength, lacks appropriate home exercise program, pain with function, weight-bearing intolerance, participation limitations, activity limitations and endurance  Symptom irritability: moderate    Assessment details: 70 year old female patient reports to PT with R sided low back pain and radiating pain to posterior R knee and R groin. No red flags noted, no numbness/tingling, LQS unremarkable. Patient presents with R anteriorly rotated inominate, and decreased motor coordination of core musculature. FADIR negative and repeated movement assessment inconclusive at time of evaluation. Patient will benefit from skilled PT services to address current impairments and functional limitations to help patient return to her PLOF.       Understanding of Dx/Px/POC: good     Prognosis: good    Goals  STG  1. Patient will be independent with completion of HEP throughout therapy  2. Patient will have at worst 4/10 pain so patient can wake up with less discomfort in 3 weeks.  3. Patient will sit in her car without increase in groin pain in 4 weeks.  4. Patient will have no aggravation of symptoms with daily activities in 6 weeks.   5. Patient will increase B glute strength by at least 1/2 grade in 6 weeks.  6. Patient will have at least minimal restriction with lumbar extension in 6 weeks.    Plan  Patient would benefit from: skilled physical therapy          Subjective Evaluation    History of Present Illness  Mechanism of injury:  Patient reports with R sided low back pain with radiating R LE pain that started again in early March. Patient notes most of her pain is in her R groin and behind her R knee. Patient denies numbness/tingling. Patient notes she wakes up with the pain and it is with her all day. Patient notes her pain is better at work when she is moving. Patient notes when she is driving she feels it in the groin area but not all the time. Patient denies difficulty sleeping. Patient notes she was very stressed at that time when her pain started.   Patient Goals  Patient goals for therapy: independence with ADLs/IADLs, return to sport/leisure activities, decreased pain and increased motion    Pain  Current pain rating: 3  At best pain ratin  At worst pain ratin  Quality: dull ache and radiating  Relieving factors: change in position and medications          Objective     Concurrent Complaints  Negative for night pain, disturbed sleep, bladder dysfunction, bowel dysfunction and saddle (S4) numbness    Neurological Testing     Sensation     Lumbar   Left   Intact: light touch    Right   Intact: light touch    Active Range of Motion     Lumbar   Flexion:  WFL  Extension:  Restriction level: moderate    Strength/Myotome Testing     Left Hip   Planes of Motion   Flexion: 4  Extension: 3+  Abduction: 3+    Right Hip   Planes of Motion   Flexion: 4  Extension: 3+  Abduction: 3+    Left Knee   Flexion: 4  Extension: 4    Right Knee   Flexion: 4  Extension: 4    Left Ankle/Foot   Dorsiflexion: 4  Great toe extension: 4    Right Ankle/Foot   Dorsiflexion: 4-  Great toe extension: 4    Tests     Lumbar     Left   Negative crossed SLR, passive SLR and slump test.     Right   Negative crossed SLR, passive SLR and slump test.     General Comments:      Lumbar Comments  R hip ER 90/90 moderate limitation  R anteriorly rotated inominate      Flowsheet Rows      Flowsheet Row Most Recent Value   PT/OT G-Codes    Current Score 69   Projected  Score 74               Precautions: osteoporosis      Manuals 5/29            Lumbar mobs As needed            Pelvic MET As needed                         Repeated movement reassessment As needed            Neuro Re-Ed             Diaphragmatic breathing r                         Bridges with PF+TA             Heel taps w/ PF+TA                                                    Ther Ex             Self pelvic MET to correct for R inominate r            Faby pose  r                         treadmill                                                                 Ther Activity                                       Gait Training                                       Modalities

## 2025-06-02 ENCOUNTER — OFFICE VISIT (OUTPATIENT)
Dept: PHYSICAL THERAPY | Facility: CLINIC | Age: 70
End: 2025-06-02
Attending: PHYSICAL THERAPIST
Payer: COMMERCIAL

## 2025-06-02 DIAGNOSIS — M54.41 ACUTE RIGHT-SIDED LOW BACK PAIN WITH RIGHT-SIDED SCIATICA: Primary | ICD-10-CM

## 2025-06-02 PROCEDURE — 97110 THERAPEUTIC EXERCISES: CPT | Performed by: PHYSICAL THERAPIST

## 2025-06-02 PROCEDURE — 97140 MANUAL THERAPY 1/> REGIONS: CPT | Performed by: PHYSICAL THERAPIST

## 2025-06-02 PROCEDURE — 97112 NEUROMUSCULAR REEDUCATION: CPT | Performed by: PHYSICAL THERAPIST

## 2025-06-02 NOTE — PROGRESS NOTES
"Daily Note     Today's date: 2025  Patient name: Keren Kim  : 1955  MRN: [id number]  Referring provider: Pancho Carpenter, PT  Dx:   Encounter Diagnosis     ICD-10-CM    1. Acute right-sided low back pain with right-sided sciatica  M54.41                      Subjective: Patient reports had bad pain over weekend. Notes HEP not making it worse or better.      Objective: See treatment diary below    Repeated movement assessment: inconclusive, repeated PPU with HOC decreased neural tension, increased symptoms but will reassess    Double knee to chest repeated improved symptoms - gave for HEP 5-6x a day 10 reps 5\" holds    Assessment: Tolerated treatment well. Patient would benefit from continued PT. Treatment plan initiated.       Plan: Progress treatment as tolerated.       Precautions: osteoporosis      Manuals            Lumbar mobs As needed            Pelvic MET As needed                         Repeated movement reassessment As needed Inconclusive, trialing manual DKTC/seated lumbar flexion  as had some improvement           Neuro Re-Ed             Diaphragmatic breathing r                         Bridges with PF+TA  20x 5\" holds w/ TA blue            Heel taps w/ PF+TA  10x B w/ TA                                                  Ther Ex             Self pelvic MET to correct for R inominate r            Faby pose  r                         treadmill  5 min                                                               Ther Activity                                       Gait Training                                       Modalities                                                   "

## 2025-06-05 ENCOUNTER — APPOINTMENT (OUTPATIENT)
Dept: PHYSICAL THERAPY | Facility: CLINIC | Age: 70
End: 2025-06-05
Attending: PHYSICAL THERAPIST
Payer: COMMERCIAL

## 2025-06-05 DIAGNOSIS — M54.41 ACUTE RIGHT-SIDED LOW BACK PAIN WITH RIGHT-SIDED SCIATICA: Primary | ICD-10-CM

## 2025-06-05 PROCEDURE — 97112 NEUROMUSCULAR REEDUCATION: CPT | Performed by: PHYSICAL THERAPIST

## 2025-06-05 PROCEDURE — 97110 THERAPEUTIC EXERCISES: CPT | Performed by: PHYSICAL THERAPIST

## 2025-06-05 PROCEDURE — 97140 MANUAL THERAPY 1/> REGIONS: CPT | Performed by: PHYSICAL THERAPIST

## 2025-06-05 NOTE — PROGRESS NOTES
"Daily Note     Today's date: 2025  Patient name: Keren Kim  : 1955  MRN: [id number]  Referring provider: Pancho Carpenter, PT  Dx:   Encounter Diagnosis     ICD-10-CM    1. Acute right-sided low back pain with right-sided sciatica  M54.41                      Subjective: Patient reports improvement in pain and mostly seems to benefit from DKTC.    Objective: See treatment diary below    Repeated movement assessment: inconclusive, repeated PPU with HOC decreased neural tension, increased symptoms but will reassess    Double knee to chest repeated improved symptoms - gave for HEP 5-6x a day 10 reps 5\" holds    Assessment: Tolerated treatment well. Patient would benefit from continued PT. Treatment plan progressed.       Plan: Progress treatment as tolerated.       Precautions: osteoporosis      Manuals           Lumbar mobs As needed            Pelvic MET As needed                         Repeated movement reassessment As needed Inconclusive, trialing manual DKTC/seated lumbar flexion  as had some improvement Manual DKTC MK          Neuro Re-Ed             Diaphragmatic breathing r                         Bridges with PF+TA  20x 5\" holds w/ TA blue  10x 10\" holds          Heel taps w/ PF+TA  10x B w/ TA 20x          Standing band anti-rotations   15x b grn          Iso hip flexion B   10x 5\" holds                        Ther Ex             Self pelvic MET to correct for R inominate r            Faby pose  r                         treadmill  5 min 5 min          LTR w/ ball   10x B w/ ball          Seated lumbar flexion w/ tball   15x 5\" holds                                    Ther Activity                                       Gait Training                                       Modalities                                                   "

## 2025-06-09 ENCOUNTER — APPOINTMENT (OUTPATIENT)
Dept: PHYSICAL THERAPY | Facility: CLINIC | Age: 70
End: 2025-06-09
Attending: PHYSICAL THERAPIST
Payer: COMMERCIAL

## 2025-06-09 DIAGNOSIS — M54.41 ACUTE RIGHT-SIDED LOW BACK PAIN WITH RIGHT-SIDED SCIATICA: Primary | ICD-10-CM

## 2025-06-09 PROCEDURE — 97110 THERAPEUTIC EXERCISES: CPT | Performed by: PHYSICAL THERAPIST

## 2025-06-09 PROCEDURE — 97140 MANUAL THERAPY 1/> REGIONS: CPT | Performed by: PHYSICAL THERAPIST

## 2025-06-09 PROCEDURE — 97112 NEUROMUSCULAR REEDUCATION: CPT | Performed by: PHYSICAL THERAPIST

## 2025-06-09 NOTE — PROGRESS NOTES
"Daily Note     Today's date: 2025  Patient name: Keren Kim  : 1955  MRN: [id number]  Referring provider: Pancho Carpenter, PT  Dx:   Encounter Diagnosis     ICD-10-CM    1. Acute right-sided low back pain with right-sided sciatica  M54.41                      Subjective: Patient reports overall better, pain seems to be more up her leg instead of her knee, but did have a little tingling in her foot this morning that lasted several seconds.    Objective: See treatment diary below      Double knee to chest repeated improved symptoms - gave for HEP 5-6x a day 10 reps 5\" holds    Assessment: Tolerated treatment well. Patient would benefit from continued PT. Treatment plan progressed. Symptoms with exercises took longer to be reproduced post flexion rotation off table.       Plan: Progress treatment as tolerated.           Precautions: osteoporosis      Manuals          Lumbar mobs As needed   assessed         Pelvic MET As needed                         Repeated movement reassessment As needed Inconclusive, trialing manual DKTC/seated lumbar flexion  as had some improvement Manual DKTC MK MK         Neuro Re-Ed             Diaphragmatic breathing r                         Bridges with PF+TA  20x 5\" holds w/ TA blue  10x 10\" holds 10x 10\" holds         Heel taps w/ PF+TA  10x B w/ TA 20x 20x          Standing band anti-rotations   15x b grn 15x B grn         Iso hip flexion B   10x 5\" holds  10x 10\" holds                       Ther Ex             Self pelvic MET to correct for R inominate r            Faby pose  r                         treadmill  5 min 5 min 5 min         LTR w/ ball   10x B w/ ball 12x B w/ ball          Seated lumbar flexion w/ tball   15x 5\" holds 10x 5\" holds         Standing piriformis stretch on table    2x20\" holds                       Ther Activity                                       Gait Training                                       Modalities              "

## 2025-06-12 ENCOUNTER — OFFICE VISIT (OUTPATIENT)
Dept: PHYSICAL THERAPY | Facility: CLINIC | Age: 70
End: 2025-06-12
Attending: PHYSICAL THERAPIST
Payer: COMMERCIAL

## 2025-06-12 DIAGNOSIS — M54.41 ACUTE RIGHT-SIDED LOW BACK PAIN WITH RIGHT-SIDED SCIATICA: Primary | ICD-10-CM

## 2025-06-12 PROCEDURE — 97110 THERAPEUTIC EXERCISES: CPT | Performed by: PHYSICAL THERAPIST

## 2025-06-12 PROCEDURE — 97140 MANUAL THERAPY 1/> REGIONS: CPT | Performed by: PHYSICAL THERAPIST

## 2025-06-12 NOTE — PROGRESS NOTES
"Daily Note     Today's date: 2025  Patient name: Keren Kim  : 1955  MRN: [id number]  Referring provider: Pancho Carpenter, PT  Dx:   Encounter Diagnosis     ICD-10-CM    1. Acute right-sided low back pain with right-sided sciatica  M54.41                      Subjective: Patient reports overall better.    Objective: See treatment diary below      Double knee to chest repeated improved symptoms - gave for HEP 5-6x a day 10 reps 5\" holds    Assessment: Tolerated treatment well. Patient would benefit from continued PT. Treatment plan progressed. Reproduction of symptoms with R gluteal musculature palpation.       Plan: Progress treatment as tolerated.           Precautions: osteoporosis      Manuals         Lumbar mobs As needed   assessed assessed        Pelvic MET As needed            Glute mx work     R prone MK        Repeated movement reassessment As needed Inconclusive, trialing manual DKTC/seated lumbar flexion  as had some improvement Manual DKTC MK MK MK        Neuro Re-Ed             Diaphragmatic breathing r                         Bridges with PF+TA  20x 5\" holds w/ TA blue  10x 10\" holds 10x 10\" holds 10x 10\" holds         Heel taps w/ PF+TA  10x B w/ TA 20x 20x  25x         Standing band anti-rotations   15x b grn 15x B grn 20x B grn        Iso hip flexion B   10x 5\" holds  10x 10\" holds  10x 10\" holds                      Ther Ex             Self pelvic MET to correct for R inominate r            Faby pose  r                         treadmill  5 min 5 min 5 min 5 min        LTR w/ ball   10x B w/ ball 12x B w/ ball  12x B w/ ball        Seated lumbar flexion w/ tball   15x 5\" holds 10x 5\" holds         Standing piriformis stretch on table    2x20\" holds  3x20\" holds                      Ther Activity                                       Gait Training                                       Modalities                                                   "

## 2025-06-16 ENCOUNTER — APPOINTMENT (OUTPATIENT)
Dept: PHYSICAL THERAPY | Facility: CLINIC | Age: 70
End: 2025-06-16
Attending: PHYSICAL THERAPIST
Payer: COMMERCIAL

## 2025-06-19 ENCOUNTER — APPOINTMENT (OUTPATIENT)
Dept: PHYSICAL THERAPY | Facility: CLINIC | Age: 70
End: 2025-06-19
Attending: PHYSICAL THERAPIST
Payer: COMMERCIAL

## 2025-06-23 ENCOUNTER — OFFICE VISIT (OUTPATIENT)
Dept: PHYSICAL THERAPY | Facility: CLINIC | Age: 70
End: 2025-06-23
Attending: PHYSICAL THERAPIST
Payer: COMMERCIAL

## 2025-06-23 DIAGNOSIS — M54.41 ACUTE RIGHT-SIDED LOW BACK PAIN WITH RIGHT-SIDED SCIATICA: Primary | ICD-10-CM

## 2025-06-23 PROCEDURE — 97140 MANUAL THERAPY 1/> REGIONS: CPT | Performed by: PHYSICAL THERAPIST

## 2025-06-23 PROCEDURE — 97110 THERAPEUTIC EXERCISES: CPT | Performed by: PHYSICAL THERAPIST

## 2025-06-23 NOTE — PROGRESS NOTES
"Daily Note     Today's date: 2025  Patient name: Keren Kim  : 1955  MRN: [id number]  Referring provider: Pancho Carpenter, PT  Dx:   Encounter Diagnosis     ICD-10-CM    1. Acute right-sided low back pain with right-sided sciatica  M54.41                      Subjective: Patient reports had relief after last visit.     Objective: See treatment diary below      Assessment: Tolerated treatment well. Patient would benefit from continued PT.       Plan: Progress treatment as tolerated.           Precautions: osteoporosis      Manuals        Lumbar mobs As needed   assessed assessed        Pelvic MET As needed            Glute mx work     R prone MK MK       Repeated movement reassessment As needed Inconclusive, trialing manual DKTC/seated lumbar flexion  as had some improvement Manual DKTC MK MK MK        Neuro Re-Ed             Diaphragmatic breathing r                         Bridges with PF+TA  20x 5\" holds w/ TA blue  10x 10\" holds 10x 10\" holds 10x 10\" holds  10x 10\" holds       Heel taps w/ PF+TA  10x B w/ TA 20x 20x  25x  25x        Standing band anti-rotations   15x b grn 15x B grn 20x B grn 20 B grn       Iso hip flexion B   10x 5\" holds  10x 10\" holds  10x 10\" holds  10x 10\" holds                     Ther Ex             Self pelvic MET to correct for R inominate r            Faby pose  r                         treadmill  5 min 5 min 5 min 5 min 5 min       LTR w/ ball   10x B w/ ball 12x B w/ ball  12x B w/ ball 12x B       Seated lumbar flexion w/ tball   15x 5\" holds 10x 5\" holds         Standing piriformis stretch on table    2x20\" holds  3x20\" holds  3x30\" holds                     Ther Activity                                       Gait Training                                       Modalities                                                   "

## 2025-06-30 ENCOUNTER — OFFICE VISIT (OUTPATIENT)
Dept: PHYSICAL THERAPY | Facility: CLINIC | Age: 70
End: 2025-06-30
Attending: PHYSICAL THERAPIST
Payer: COMMERCIAL

## 2025-06-30 DIAGNOSIS — M54.41 ACUTE RIGHT-SIDED LOW BACK PAIN WITH RIGHT-SIDED SCIATICA: Primary | ICD-10-CM

## 2025-06-30 PROCEDURE — 97140 MANUAL THERAPY 1/> REGIONS: CPT | Performed by: PHYSICAL THERAPIST

## 2025-06-30 PROCEDURE — 97112 NEUROMUSCULAR REEDUCATION: CPT | Performed by: PHYSICAL THERAPIST

## 2025-06-30 NOTE — PROGRESS NOTES
"Daily Note     Today's date: 2025  Patient name: Keren Kim  : 1955  MRN: [id number]  Referring provider: Pancho Carpenter, PT  Dx:   Encounter Diagnosis     ICD-10-CM    1. Acute right-sided low back pain with right-sided sciatica  M54.41                      Subjective: Patient reports symptoms have been feeling good since last visit.     Objective: See treatment diary below      Assessment: Tolerated treatment well. Patient would benefit from continued PT.       Plan: Progress treatment as tolerated.           Precautions: osteoporosis      Manuals       Lumbar mobs As needed   assessed assessed        Pelvic MET As needed            Glute mx work     R prone MK MK MK      Repeated movement reassessment As needed Inconclusive, trialing manual DKTC/seated lumbar flexion  as had some improvement Manual DKTC MK MK MK        Neuro Re-Ed             Diaphragmatic breathing r                         Bridges with PF+TA  20x 5\" holds w/ TA blue  10x 10\" holds 10x 10\" holds 10x 10\" holds  10x 10\" holds 10x 10\" holds       Heel taps w/ PF+TA  10x B w/ TA 20x 20x  25x  25x  25x       Standing band anti-rotations   15x b grn 15x B grn 20x B grn 20 B grn       Iso hip flexion B   10x 5\" holds  10x 10\" holds  10x 10\" holds  10x 10\" holds  10x 10\" holds                    Ther Ex             Self pelvic MET to correct for R inominate r            Faby pose  r                         treadmill  5 min 5 min 5 min 5 min 5 min 5 min      LTR w/ ball   10x B w/ ball 12x B w/ ball  12x B w/ ball 12x B 12x B      Seated lumbar flexion w/ tball   15x 5\" holds 10x 5\" holds         Standing piriformis stretch on table    2x20\" holds  3x20\" holds  3x30\" holds  3x30\" holds                    Ther Activity                                       Gait Training                                       Modalities                                                   "